# Patient Record
Sex: FEMALE | Race: WHITE | Employment: UNEMPLOYED | URBAN - METROPOLITAN AREA
[De-identification: names, ages, dates, MRNs, and addresses within clinical notes are randomized per-mention and may not be internally consistent; named-entity substitution may affect disease eponyms.]

---

## 2020-02-23 ENCOUNTER — APPOINTMENT (OUTPATIENT)
Dept: GENERAL RADIOLOGY | Age: 70
End: 2020-02-23
Payer: COMMERCIAL

## 2020-02-23 ENCOUNTER — APPOINTMENT (OUTPATIENT)
Dept: CT IMAGING | Age: 70
End: 2020-02-23
Payer: COMMERCIAL

## 2020-02-23 ENCOUNTER — HOSPITAL ENCOUNTER (EMERGENCY)
Age: 70
Discharge: HOME | End: 2020-02-24
Attending: EMERGENCY MEDICINE
Payer: COMMERCIAL

## 2020-02-23 VITALS
TEMPERATURE: 97.8 F | WEIGHT: 118.38 LBS | HEIGHT: 62 IN | SYSTOLIC BLOOD PRESSURE: 146 MMHG | DIASTOLIC BLOOD PRESSURE: 83 MMHG | RESPIRATION RATE: 18 BRPM | HEART RATE: 90 BPM | BODY MASS INDEX: 21.79 KG/M2 | OXYGEN SATURATION: 100 %

## 2020-02-23 LAB
A/G RATIO: 1.8 (ref 1.1–2.2)
ALBUMIN SERPL-MCNC: 4.4 G/DL (ref 3.4–5)
ALP BLD-CCNC: 73 U/L (ref 40–129)
ALT SERPL-CCNC: 15 U/L (ref 10–40)
AMORPHOUS: ABNORMAL /HPF
ANION GAP SERPL CALCULATED.3IONS-SCNC: 16 MMOL/L (ref 3–16)
AST SERPL-CCNC: 19 U/L (ref 15–37)
BACTERIA: ABNORMAL /HPF
BASOPHILS ABSOLUTE: 0.1 K/UL (ref 0–0.2)
BASOPHILS RELATIVE PERCENT: 1.4 %
BILIRUB SERPL-MCNC: 0.4 MG/DL (ref 0–1)
BILIRUBIN URINE: NEGATIVE
BLOOD, URINE: NEGATIVE
BUN BLDV-MCNC: 10 MG/DL (ref 7–20)
CALCIUM SERPL-MCNC: 9.1 MG/DL (ref 8.3–10.6)
CHLORIDE BLD-SCNC: 104 MMOL/L (ref 99–110)
CLARITY: CLEAR
CO2: 21 MMOL/L (ref 21–32)
COLOR: YELLOW
CREAT SERPL-MCNC: <0.5 MG/DL (ref 0.6–1.2)
EOSINOPHILS ABSOLUTE: 0 K/UL (ref 0–0.6)
EOSINOPHILS RELATIVE PERCENT: 0.2 %
EPITHELIAL CELLS, UA: ABNORMAL /HPF (ref 0–5)
GFR AFRICAN AMERICAN: >60
GFR NON-AFRICAN AMERICAN: >60
GLOBULIN: 2.5 G/DL
GLUCOSE BLD-MCNC: 125 MG/DL (ref 70–99)
GLUCOSE URINE: NEGATIVE MG/DL
HCT VFR BLD CALC: 38.2 % (ref 36–48)
HEMOGLOBIN: 13 G/DL (ref 12–16)
KETONES, URINE: ABNORMAL MG/DL
LACTIC ACID: 1.5 MMOL/L (ref 0.4–2)
LEUKOCYTE ESTERASE, URINE: ABNORMAL
LIPASE: 41 U/L (ref 13–60)
LYMPHOCYTES ABSOLUTE: 1.1 K/UL (ref 1–5.1)
LYMPHOCYTES RELATIVE PERCENT: 11.7 %
MCH RBC QN AUTO: 28.9 PG (ref 26–34)
MCHC RBC AUTO-ENTMCNC: 34 G/DL (ref 31–36)
MCV RBC AUTO: 85.1 FL (ref 80–100)
MICROSCOPIC EXAMINATION: YES
MONOCYTES ABSOLUTE: 0.4 K/UL (ref 0–1.3)
MONOCYTES RELATIVE PERCENT: 4 %
NEUTROPHILS ABSOLUTE: 7.6 K/UL (ref 1.7–7.7)
NEUTROPHILS RELATIVE PERCENT: 82.7 %
NITRITE, URINE: NEGATIVE
PDW BLD-RTO: 14.6 % (ref 12.4–15.4)
PH UA: 7 (ref 5–8)
PLATELET # BLD: 374 K/UL (ref 135–450)
PMV BLD AUTO: 9.5 FL (ref 5–10.5)
POTASSIUM REFLEX MAGNESIUM: 4 MMOL/L (ref 3.5–5.1)
PROTEIN UA: NEGATIVE MG/DL
RBC # BLD: 4.49 M/UL (ref 4–5.2)
RBC UA: ABNORMAL /HPF (ref 0–4)
REASON FOR REJECTION: NORMAL
REJECTED TEST: NORMAL
SODIUM BLD-SCNC: 141 MMOL/L (ref 136–145)
SPECIFIC GRAVITY UA: 1.01 (ref 1–1.03)
TOTAL PROTEIN: 6.9 G/DL (ref 6.4–8.2)
TROPONIN: <0.01 NG/ML
URINE TYPE: ABNORMAL
UROBILINOGEN, URINE: 0.2 E.U./DL
WBC # BLD: 9.2 K/UL (ref 4–11)
WBC UA: ABNORMAL /HPF (ref 0–5)

## 2020-02-23 PROCEDURE — 84484 ASSAY OF TROPONIN QUANT: CPT

## 2020-02-23 PROCEDURE — 71046 X-RAY EXAM CHEST 2 VIEWS: CPT

## 2020-02-23 PROCEDURE — 93005 ELECTROCARDIOGRAM TRACING: CPT | Performed by: EMERGENCY MEDICINE

## 2020-02-23 PROCEDURE — 80053 COMPREHEN METABOLIC PANEL: CPT

## 2020-02-23 PROCEDURE — 74177 CT ABD & PELVIS W/CONTRAST: CPT

## 2020-02-23 PROCEDURE — 85025 COMPLETE CBC W/AUTO DIFF WBC: CPT

## 2020-02-23 PROCEDURE — 96374 THER/PROPH/DIAG INJ IV PUSH: CPT

## 2020-02-23 PROCEDURE — 83605 ASSAY OF LACTIC ACID: CPT

## 2020-02-23 PROCEDURE — 6360000004 HC RX CONTRAST MEDICATION: Performed by: EMERGENCY MEDICINE

## 2020-02-23 PROCEDURE — 99284 EMERGENCY DEPT VISIT MOD MDM: CPT

## 2020-02-23 PROCEDURE — 6360000002 HC RX W HCPCS: Performed by: EMERGENCY MEDICINE

## 2020-02-23 PROCEDURE — 83690 ASSAY OF LIPASE: CPT

## 2020-02-23 PROCEDURE — 2580000003 HC RX 258: Performed by: EMERGENCY MEDICINE

## 2020-02-23 PROCEDURE — 81001 URINALYSIS AUTO W/SCOPE: CPT

## 2020-02-23 RX ORDER — OMEPRAZOLE 20 MG/1
20 CAPSULE, DELAYED RELEASE ORAL DAILY
Qty: 14 CAPSULE | Refills: 0 | Status: SHIPPED | OUTPATIENT
Start: 2020-02-23

## 2020-02-23 RX ORDER — KETOROLAC TROMETHAMINE 30 MG/ML
30 INJECTION, SOLUTION INTRAMUSCULAR; INTRAVENOUS ONCE
Status: COMPLETED | OUTPATIENT
Start: 2020-02-23 | End: 2020-02-23

## 2020-02-23 RX ORDER — 0.9 % SODIUM CHLORIDE 0.9 %
1000 INTRAVENOUS SOLUTION INTRAVENOUS ONCE
Status: COMPLETED | OUTPATIENT
Start: 2020-02-23 | End: 2020-02-24

## 2020-02-23 RX ORDER — ALPRAZOLAM 0.5 MG/1
0.5 TABLET ORAL NIGHTLY PRN
COMMUNITY

## 2020-02-23 RX ADMIN — KETOROLAC TROMETHAMINE 30 MG: 30 INJECTION, SOLUTION INTRAMUSCULAR at 22:01

## 2020-02-23 RX ADMIN — SODIUM CHLORIDE 1000 ML: 9 INJECTION, SOLUTION INTRAVENOUS at 22:01

## 2020-02-23 RX ADMIN — IOPAMIDOL 80 ML: 755 INJECTION, SOLUTION INTRAVENOUS at 22:56

## 2020-02-23 ASSESSMENT — PAIN DESCRIPTION - ORIENTATION: ORIENTATION: LOWER

## 2020-02-23 ASSESSMENT — PAIN SCALES - GENERAL
PAINLEVEL_OUTOF10: 4
PAINLEVEL_OUTOF10: 4

## 2020-02-23 ASSESSMENT — PAIN DESCRIPTION - LOCATION: LOCATION: ABDOMEN

## 2020-02-23 ASSESSMENT — PAIN DESCRIPTION - PAIN TYPE: TYPE: ACUTE PAIN

## 2020-02-24 LAB
EKG ATRIAL RATE: 91 BPM
EKG DIAGNOSIS: NORMAL
EKG P AXIS: 53 DEGREES
EKG P-R INTERVAL: 152 MS
EKG Q-T INTERVAL: 368 MS
EKG QRS DURATION: 72 MS
EKG QTC CALCULATION (BAZETT): 452 MS
EKG R AXIS: 17 DEGREES
EKG T AXIS: 21 DEGREES
EKG VENTRICULAR RATE: 91 BPM

## 2020-02-24 PROCEDURE — 93010 ELECTROCARDIOGRAM REPORT: CPT | Performed by: INTERNAL MEDICINE

## 2020-02-24 NOTE — ED PROVIDER NOTES
Respirations clear to ausculation, no respiratory distress  Cardiac: Regular rate and rhythm without gallops, murmurs, or rubs  Abdomen: Soft, moderate epigastric to right upper quadrant tenderness, no rebound guarding peritoneal signs. Skin: no pallor, erythema, lesions or other abnormalities on exposed skin of face and arms  Extremities: Normal ROM of bilateral upper extremities at shoulders, elbows, wrists; normal ROM of bilateral LE at hips and knees. Neurologic: Alert, oriented x 3. No focal deficits upon moving arms and legs  Psychiatric: Appropriate demeanor without agitation or internal stimulation    EKG interpretation:  Rate: 91  Rhythm: Normal Sinus  Axis: Normal leftward  Hypertrophy: none  Intervals: Normal OR interval; Normal QRS interval; Normal QT interval  Ischemia: no ST/T wave changes concerning for ischemia    MEDICAL DECISION MAKING  28-year-old lady notably appears significantly younger than her stated age and well-appearing, here with 2 months of intermittent epigastric to right upper quadrant pain that had resolved for about a week and just recurred approximately 2 hours prior to arrival.  This most recent episode followed eating some food. Pain is certainly reproducible upon palpation of her epigastrium to right upper quadrant. Differential includes biliary hepatic pancreatic cardiac gastric etiology. With the palpation component certainly an intra-abdominal process such as symptomatic gallstones seems likely. We will administer some fluids and some Toradol and work her up appropriately. Cardiac workup: wnl -- neg trop, ekg, and cxr    Abdominal workup + CT: small umbilical hernia (fat containing)--no incarceration    tx with prilosec, f/u with gi (lives in [place])    Ifrah Castillo 103:  Epigastric abdominal pain  Fat-containing umbilical hernia      This medical chart used with aid of transcription software.   As such, there may be inadvertent errors in transcription of spellings and words despite physician's attempts to correct all possible errors.          Amy Ortiz MD  02/23/20 [id number]

## 2020-02-24 NOTE — ED NOTES
Patient states that she feels better. Will be going back to [place] in several weeks. Pt to follow up with her PCP on arrival home, she will call tomorrow to make appt. Patient given d/c instructions with return verbalization including rx. To return with worsening s/s. Pt ambulated to lob with steady gait.      Harriett King RN  02/24/20 [id number]

## 2020-02-24 NOTE — ED TRIAGE NOTES
Patient c/o epigastric and lower abd pain since 1900 this evening. + nausea, vomited once. Denies diarrhea. Pt ate an avocado and chips prior to getting sick. Pt was diagnosed with flu A several weeks ago. Feels netter from flu.   EMD here

## 2020-12-04 ENCOUNTER — OFFICE (OUTPATIENT)
Dept: URBAN - METROPOLITAN AREA CLINIC 67 | Facility: CLINIC | Age: 70
End: 2020-12-04

## 2020-12-04 VITALS — TEMPERATURE: 93.4 F | DIASTOLIC BLOOD PRESSURE: 79 MMHG | HEIGHT: 62 IN | SYSTOLIC BLOOD PRESSURE: 127 MMHG

## 2020-12-04 DIAGNOSIS — R19.4 ALTERED BOWEL FUNCTION: ICD-10-CM

## 2020-12-04 DIAGNOSIS — R10.12 LUQ PAIN: ICD-10-CM

## 2020-12-04 DIAGNOSIS — Z12.11 SCREENING FOR COLON CANCER: ICD-10-CM

## 2020-12-04 DIAGNOSIS — K21.9 GASTROESOPHAGEAL REFLUX DISEASE: ICD-10-CM

## 2020-12-04 DIAGNOSIS — R10.32 LLQ PAIN: ICD-10-CM

## 2020-12-04 PROCEDURE — 99204 OFFICE O/P NEW MOD 45 MIN: CPT | Performed by: INTERNAL MEDICINE

## 2020-12-04 NOTE — SERVICEHPINOTES
The patient is a sanjuanita 70-year-old female referred by Dr. Chavarria for evaluation of dyspepsia and reflux, as well as for a significant change in bowel habits and left mid quadrant discomfort. The patient states she has been well overall up until 4-6 weeks ago when she noted a left mid quadrant abdominal pain, as well as constipation. She did take some MiraLAX and some prune juice, which have helped, though she still gets intermittent discomfort. She does get intermittent nausea and reflux, but is not on any over-the-counter medications. Her last colonoscopy was in 2005 by Dr. Armenta, she is due. The patient feels well overall otherwise without GI bleeding, fevers, chills, nor weight loss.

## 2020-12-24 ENCOUNTER — AMBULATORY SURGICAL CENTER (OUTPATIENT)
Dept: URBAN - METROPOLITAN AREA SURGERY 42 | Facility: SURGERY | Age: 70
End: 2020-12-24

## 2020-12-24 VITALS — HEIGHT: 62 IN

## 2020-12-24 DIAGNOSIS — K21.9 GASTRO-ESOPHAGEAL REFLUX DISEASE WITHOUT ESOPHAGITIS: ICD-10-CM

## 2020-12-24 DIAGNOSIS — K62.1 RECTAL POLYP: ICD-10-CM

## 2020-12-24 LAB — SURGICAL: PDF REPORT: (no result)

## 2020-12-24 PROCEDURE — 45385 COLONOSCOPY W/LESION REMOVAL: CPT | Performed by: INTERNAL MEDICINE
